# Patient Record
Sex: FEMALE | Race: WHITE | NOT HISPANIC OR LATINO | Employment: STUDENT | URBAN - METROPOLITAN AREA
[De-identification: names, ages, dates, MRNs, and addresses within clinical notes are randomized per-mention and may not be internally consistent; named-entity substitution may affect disease eponyms.]

---
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
## 2022-11-15 ENCOUNTER — PROCEDURE VISIT (OUTPATIENT)
Dept: OBSTETRICS AND GYNECOLOGY | Facility: CLINIC | Age: 22
End: 2022-11-15

## 2022-11-15 VITALS
SYSTOLIC BLOOD PRESSURE: 100 MMHG | WEIGHT: 82 LBS | BODY MASS INDEX: 17.21 KG/M2 | DIASTOLIC BLOOD PRESSURE: 60 MMHG | HEIGHT: 58 IN

## 2022-11-15 DIAGNOSIS — Z30.46 NEXPLANON REMOVAL: Primary | ICD-10-CM

## 2022-11-15 DIAGNOSIS — Z30.017 NEXPLANON INSERTION: ICD-10-CM

## 2022-11-15 PROBLEM — Z86.69 HISTORY OF RETINOPATHY OF PREMATURITY: Status: ACTIVE | Noted: 2022-07-01

## 2022-11-15 PROCEDURE — 11983 REMOVE/INSERT DRUG IMPLANT: CPT | Performed by: NURSE PRACTITIONER

## 2022-11-15 RX ORDER — ETONOGESTREL 68 MG/1
68 IMPLANT SUBCUTANEOUS
COMMUNITY
End: 2022-11-15

## 2022-11-15 RX ORDER — SCOLOPAMINE TRANSDERMAL SYSTEM 1 MG/1
PATCH, EXTENDED RELEASE TRANSDERMAL
COMMUNITY
Start: 2022-09-30

## 2022-11-15 NOTE — PROGRESS NOTES
Nexplanon Removal    Date of procedure:  11/15/2022    Risks and benefits discussed? yes  All questions answered? yes  Consents given by the patient  Written consent obtained? yes    Local anesthesia used:  yes - 3 cc's of  Meds; anesthesia local: None, 1% lidocaine    Procedure documentation:    The upper left arm (non-dominant) was marked at the intended site of removal.  The skin was cleansed with an antispetic solution.  Local anesthesia was injected.  A vertical horizontal was created at the distal tip of the implant.  The implant was removed intact without difficulty.  A 4x4 sterile gauze was placed over the incision site and the arm was wrapped with gauze.  She tolerated the procedure well.  There were no complications.  EBL was minimal.    Post procedure instructions: Remove the wrapping in 24 hours and cover with a  band-aid if still open.    Follow up needed: PRN        Diagnoses and all orders for this visit:    1. Nexplanon removal (Primary)    2. Nexplanon insertion  -     Etonogestrel (NEXPLANON) 68 MG subdermal implant        This note was electronically signed.    Ghada Marr, CHARISMA  November 15, 2022  Nexplanon Insertion    No LMP recorded.    Date of procedure:  11/15/2022    Risks and benefits discussed? yes  All questions answered? yes  Consents given by the patient  Written consent obtained? yes    Local anesthesia used:  yes - 3 cc's of  Meds; anesthesia local: None, 1% lidocaine    Procedure documentation:    The upper left arm (non-dominant) was marked at the intended site of insertion. The skin was cleansed with an antiseptic solution.  Local anesthesia was injected.  The Nexplanon was placed subdermally without difficulty.  The devise was able to be palpated in the arm by both myself and Kavya.  The site was cleansed then a 4x4 clean gauze was place over the site of insertion and wrapped with gauze.     She tolerated the procedure well.  There were no complications.  EBL was  minimal.    Nexplanon [id number]    Post procedure instructions: Remove the wrapping in 24 hours and cover with a  band aid if still open.    Follow up needed: PRN    This note was electronically signed.    Ghada Marr, APRN  November 15, 2022